# Patient Record
Sex: FEMALE | Race: WHITE | NOT HISPANIC OR LATINO | Employment: FULL TIME | URBAN - METROPOLITAN AREA
[De-identification: names, ages, dates, MRNs, and addresses within clinical notes are randomized per-mention and may not be internally consistent; named-entity substitution may affect disease eponyms.]

---

## 2014-07-07 LAB — PAP SMEAR - HIM PATIENT REPORTED: NEGATIVE

## 2017-03-21 ENCOUNTER — TRANSFERRED RECORDS (OUTPATIENT)
Dept: HEALTH INFORMATION MANAGEMENT | Facility: CLINIC | Age: 32
End: 2017-03-21

## 2017-03-21 LAB
C TRACH DNA SPEC QL PROBE+SIG AMP: NEGATIVE
HPV ABSTRACT: NORMAL
N GONORRHOEA DNA SPEC QL PROBE+SIG AMP: NEGATIVE
PAP-ABSTRACT: NORMAL
SPECIMEN DESCRIP: NORMAL
SPECIMEN DESCRIPTION: NORMAL

## 2017-10-22 ENCOUNTER — HEALTH MAINTENANCE LETTER (OUTPATIENT)
Age: 32
End: 2017-10-22

## 2018-05-30 ENCOUNTER — HOSPITAL ENCOUNTER (OUTPATIENT)
Dept: ULTRASOUND IMAGING | Facility: CLINIC | Age: 33
Discharge: HOME | End: 2018-05-30
Attending: PEDIATRICS | Admitting: PEDIATRICS
Payer: COMMERCIAL

## 2018-05-30 ENCOUNTER — OFFICE VISIT (OUTPATIENT)
Dept: PEDIATRICS | Facility: CLINIC | Age: 33
End: 2018-05-30
Payer: COMMERCIAL

## 2018-05-30 VITALS
WEIGHT: 137 LBS | OXYGEN SATURATION: 98 % | HEART RATE: 68 BPM | SYSTOLIC BLOOD PRESSURE: 102 MMHG | DIASTOLIC BLOOD PRESSURE: 74 MMHG | TEMPERATURE: 98.3 F | HEIGHT: 65 IN | BODY MASS INDEX: 22.82 KG/M2

## 2018-05-30 DIAGNOSIS — R60.0 EDEMA OF LEFT LOWER EXTREMITY: ICD-10-CM

## 2018-05-30 DIAGNOSIS — R60.0 EDEMA OF LEFT LOWER EXTREMITY: Primary | ICD-10-CM

## 2018-05-30 DIAGNOSIS — K51.011 ULCERATIVE PANCOLITIS WITH RECTAL BLEEDING (H): ICD-10-CM

## 2018-05-30 PROCEDURE — 93971 EXTREMITY STUDY: CPT | Mod: LT

## 2018-05-30 PROCEDURE — 99214 OFFICE O/P EST MOD 30 MIN: CPT | Performed by: PEDIATRICS

## 2018-05-30 RX ORDER — LEVOTHYROXINE SODIUM 50 UG/1
TABLET ORAL
Refills: 3 | COMMUNITY
Start: 2018-04-27 | End: 2023-06-12

## 2018-05-30 RX ORDER — PERPHENAZINE 2 MG
TABLET ORAL
COMMUNITY
Start: 2014-11-17 | End: 2023-06-12

## 2018-05-30 RX ORDER — ACETAMINOPHEN 160 MG
2000 TABLET,DISINTEGRATING ORAL
COMMUNITY
Start: 2014-10-20 | End: 2023-06-12

## 2018-05-30 RX ORDER — PRENATAL VIT/IRON FUM/FOLIC AC 27MG-0.8MG
1 TABLET ORAL
COMMUNITY
Start: 2014-10-20 | End: 2023-06-12

## 2018-05-30 RX ORDER — VITAMIN B COMPLEX
1 TABLET ORAL
COMMUNITY
Start: 2015-05-01 | End: 2023-06-12

## 2018-05-30 NOTE — MR AVS SNAPSHOT
After Visit Summary   5/30/2018    Margi Little    MRN: [id number]           Patient Information     Date Of Birth          1985        Visit Information        Provider Department      5/30/2018 10:20 AM Ivonne Lipscomb MD [facility]        Today's Diagnoses     Ulcerative pancolitis with rectal bleeding (H)    -  1    Edema of left lower extremity          Care Instructions    [place] [facility] [place] [facility] - Raciel Dwyer - expect a call to set up a visit to talk about UC    We'll set up an ultrasound for you before you leave - if positive for clot, we'll talk later today, if negative, we can make a long term plan    Come back at your convenience for a physical          Follow-ups after your visit        Additional Services     GASTROENTEROLOGY ADULT REF CONSULT ONLY       Preferred Location: [facility] [phone]      Please be aware that coverage of these services is subject to the terms and limitations of your health insurance plan.  Call member services at your health plan with any benefit or coverage questions.  Any procedures must be performed at a [facility] facility OR coordinated by your clinic's referral office.    Please bring the following with you to your appointment:    (1) Any X-Rays, CTs or MRIs which have been performed.  Contact the facility where they were done to arrange for  prior to your scheduled appointment.    (2) List of current medications   (3) This referral request   (4) Any documents/labs given to you for this referral                  Future tests that were ordered for you today     Open Future Orders        Priority Expected Expires Ordered    US Lower Extremity Venous Duplex Left STAT  5/30/2019 5/30/2018            Who to contact     If you have questions or need follow up information about today's clinic visit or your schedule please contact [facility] directly at [phone].  Normal or non-critical lab and imaging results  "will be communicated to you by MyChart, letter or phone within 4 business days after the clinic has received the results. If you do not hear from us within 7 days, please contact the clinic through Platform[employer] or phone. If you have a critical or abnormal lab result, we will notify you by phone as soon as possible.  Submit refill requests through Platform[employer] or call your pharmacy and they will forward the refill request to us. Please allow 3 business days for your refill to be completed.          Additional Information About Your Visit        PlatformQ Information     PlatformQ lets you send messages to your doctor, view your test results, renew your prescriptions, schedule appointments and more. To sign up, go to [email or web address] . Click on \"Log in\" on the left side of the screen, which will take you to the Welcome page. Then click on \"Sign up Now\" on the right side of the page.     You will be asked to enter the access code listed below, as well as some personal information. Please follow the directions to create your username and password.     Your access code is: [email or web address]  Expires: 2018 11:03 AM     Your access code will  in 90 days. If you need help or a new code, please call your [facility] clinic or [phone].        Care EveryWhere ID     This is your Care EveryWhere ID. This could be used by other organizations to access your [facility] medical records  [id number]        Your Vitals Were     Pulse Temperature Height Pulse Oximetry Breastfeeding? BMI (Body Mass Index)    68 98.3  F (36.8  C) (Tympanic) 5' 5\" (1.651 m) 98% Yes 22.8 kg/m2       Blood Pressure from Last 3 Encounters:   18 102/74   12 98/62   12 96/68    Weight from Last 3 Encounters:   18 137 lb (62.1 kg)   12 136 lb (61.7 kg)   04/10/12 140 lb (63.5 kg)              We Performed the Following     GASTROENTEROLOGY ADULT REF CONSULT ONLY        Primary Care Provider Office Phone # Fax #    Ivonne Traylor " MD [place] [phone] [address] [place] [address]        Equal Access to Services     [facility] AUGUSTO : Hadii aad ku haddonshaan Sabinedidi, walupeda netoqiha, eribertota kadavidmaile nicoledheeraj, arcenio celinain hayaayvette nicolecarina guzman lamaryyvette eda. So [facility] [phone].    ATENCIÓN: Si habla español, tiene a chin disposición servicios gratuitos de asistencia lingüística. Llame al [phone].    We comply with applicable federal civil rights laws and [place] laws. We do not discriminate on the basis of race, color, national origin, age, disability, sex, sexual orientation, or gender identity.            Thank you!     Thank you for choosing [facility]  for your care. Our goal is always to provide you with excellent care. Hearing back from our patients is one way we can continue to improve our services. Please take a few minutes to complete the written survey that you may receive in the mail after your visit with us. Thank you!             Your Updated Medication List - Protect others around you: Learn how to safely use, store and throw away your medicines at www.disposemymeds.org.          This list is accurate as of 5/30/18 11:03 AM.  Always use your most recent med list.                   Brand Name Dispense Instructions for use Diagnosis    levothyroxine 50 MCG tablet    SYNTHROID/LEVOTHROID     TK 1 T PO QD        MAGNESIUM PO           Omega 3-6-9 Caps           prenatal multivitamin plus iron 27-0.8 MG Tabs per tablet      Take 1 tablet by mouth        vitamin D3 2000 units Caps      Take 2,000 Units by mouth        Vitamin-B Complex Tabs      Take 1 tablet by mouth

## 2018-05-30 NOTE — PROGRESS NOTES
SUBJECTIVE:   Margi Little is a 33 year old female who presents to clinic today for the following health issues:      Possible DVT      Duration: noticed Wednesday     Description (location/character/radiation): dark area with mass behind left knee     Intensity:  moderate    Accompanying signs and symptoms: patient fly's a lot for work, last week after flight noticed mass behind left knee with warmth to the touch, painful     History (similar episodes/previous evaluation): None    Precipitating or alleviating factors: None    Therapies tried and outcome: None       3 yo and 8 month old children at home - not currently on birth control pills.    Travels regularly for work - came home from [place] middle of last week.    Usually travels Monday-Wednesday.    Started to have redness of posterior of left knee middle of last week upon her return from plane travel - tender and red initially - now improved.  No ankle/calf swelling.  No lower extremity pain.  No recollection of trauma to area, though was in and out of airports, carrying luggage, etc.  No fevers.  No changes in breathing during this time period.    No personal history of blood clots.  Two pregnancies in the last 4 years.    Grandmother had a history of blood clots - no other information about these known - patient plans to reach out to her family for more information.    At the end of most recent pregnancy, had a lot of rectal bleeding.    Persistent rectal bleeding was investigated with a colonoscopy in March - diagnosed with Ulcerative Colitis based on these results.  The official report not currently available for my review, but patient plans to send for my review.  She had medications prescribed, but hasn't started them.  Currently having improvement in rectal bleeding - predominantly struggling with mucous in stools now.  Has had improvement with acupuncture and diet changes.   Not currently following with a gastroenterologist, but is  "interested in pursuing this.        Problem list and histories reviewed & adjusted, as indicated.  Additional history: as documented    Reviewed and updated as needed this visit by clinical staff  Tobacco  Allergies  Meds  Med Hx  Surg Hx  Fam Hx  Soc Hx      Reviewed and updated as needed this visit by Provider         ROS:  Constitutional, cardiovascular, pulmonary, msk, neuro systems are negative, except as otherwise noted.    OBJECTIVE:     /74 (BP Location: Right arm, Patient Position: Right side, Cuff Size: Adult Regular)  Pulse 68  Temp 98.3  F (36.8  C) (Tympanic)  Ht 5' 5\" (1.651 m)  Wt 137 lb (62.1 kg)  SpO2 98%  Breastfeeding? Yes  BMI 22.8 kg/m2  Body mass index is 22.8 kg/(m^2).  GENERAL: healthy, alert and no distress  RESP: lungs clear to auscultation - no rales, rhonchi or wheezes  CV: regular rate and rhythm, normal S1 S2, no S3 or S4, no murmur, click or rub, no peripheral edema and peripheral pulses strong  MS: palpable nodule with associated swelling superior to popliteal fossa, left posterior leg with associated yellowing bruise - no overlying erythema, fluctuance, or purulence.   No calf or ankle tenderness.  Symmetric calf and ankle sizes bilaterally without edema distal to affected area.  Brisk capillary refill.    NEURO: Normal strength and tone, mentation intact and speech normal  PSYCH: mentation appears normal, affect normal/bright    Diagnostic Test Results:  LE ultrasound pending    ASSESSMENT/PLAN:       ICD-10-CM    1. Edema of left lower extremity R60.0 US Lower Extremity Venous Duplex Left     With recent travel, persistent symptoms over the last several days, recommend ultrasound to evaluate for DVT, Baker's cyst.  Further treatment plan based on results.  Reviewed recent lab work through her Ob office.   Imaging arranged for later today.   2. Ulcerative pancolitis with rectal bleeding (H) K51.011 GASTROENTEROLOGY ADULT REF CONSULT ONLY  Patient to send me " records from recent colonoscopy with concern for UC involving entire colon.  Highly recommended close follow up with gastroenterology - she is in agreement.       See Patient Instructions    Ivonne Lipscomb MD  [facility]

## 2018-05-30 NOTE — PATIENT INSTRUCTIONS
[place] office [place] LATISHA Dwyer - expect a call to set up a visit to talk about UC    We'll set up an ultrasound for you before you leave - if positive for clot, we'll talk later today, if negative, we can make a long term plan    Come back at your convenience for a physical

## 2018-06-27 ENCOUNTER — OFFICE VISIT - HEALTHEAST (OUTPATIENT)
Dept: FAMILY MEDICINE | Facility: CLINIC | Age: 33
End: 2018-06-27

## 2018-06-27 DIAGNOSIS — Z00.00 ROUTINE GENERAL MEDICAL EXAMINATION AT A HEALTH CARE FACILITY: ICD-10-CM

## 2018-06-27 DIAGNOSIS — K51.90 ULCERATIVE COLITIS (H): ICD-10-CM

## 2018-06-27 DIAGNOSIS — E03.9 HYPOTHYROIDISM, UNSPECIFIED TYPE: ICD-10-CM

## 2018-06-27 DIAGNOSIS — N97.0 INFERTILITY, ANOVULATION: ICD-10-CM

## 2018-06-27 DIAGNOSIS — R79.89 ELEVATED LFTS: ICD-10-CM

## 2018-06-27 LAB
ALBUMIN SERPL-MCNC: 4.2 G/DL (ref 3.5–5)
ALP SERPL-CCNC: 57 U/L (ref 45–120)
ALT SERPL W P-5'-P-CCNC: 43 U/L (ref 0–45)
AST SERPL W P-5'-P-CCNC: 42 U/L (ref 0–40)
BILIRUB DIRECT SERPL-MCNC: 0.2 MG/DL
BILIRUB SERPL-MCNC: 0.5 MG/DL (ref 0–1)
C REACTIVE PROTEIN LHE: 0.2 MG/DL (ref 0–0.8)
ERYTHROCYTE [DISTWIDTH] IN BLOOD BY AUTOMATED COUNT: 11.1 % (ref 11–14.5)
HCT VFR BLD AUTO: 41.1 % (ref 35–47)
HGB BLD-MCNC: 13.6 G/DL (ref 12–16)
MCH RBC QN AUTO: 28.7 PG (ref 27–34)
MCHC RBC AUTO-ENTMCNC: 33.1 G/DL (ref 32–36)
MCV RBC AUTO: 87 FL (ref 80–100)
PLATELET # BLD AUTO: 282 THOU/UL (ref 140–440)
PMV BLD AUTO: 7.3 FL (ref 7–10)
PROT SERPL-MCNC: 7.4 G/DL (ref 6–8)
RBC # BLD AUTO: 4.73 MILL/UL (ref 3.8–5.4)
T3FREE SERPL-MCNC: 2.3 PG/ML (ref 1.9–3.9)
T4 FREE SERPL-MCNC: 1 NG/DL (ref 0.7–1.8)
TSH SERPL DL<=0.005 MIU/L-ACNC: 2.51 UIU/ML (ref 0.3–5)
WBC: 16.9 THOU/UL (ref 4–11)

## 2018-06-27 ASSESSMENT — MIFFLIN-ST. JEOR: SCORE: 1319.57

## 2018-06-28 LAB — THYROID PEROXIDASE ANTIBODIES - HISTORICAL: 432.8 IU/ML (ref 0–5.6)

## 2018-06-29 LAB — THYROGLOB AB SERPL-ACNC: 1.8 IU/ML (ref 0–4)

## 2018-06-30 LAB — ZINC SERPL-MCNC: 68 UG/DL (ref 60–120)

## 2018-07-03 ENCOUNTER — AMBULATORY - HEALTHEAST (OUTPATIENT)
Dept: FAMILY MEDICINE | Facility: CLINIC | Age: 33
End: 2018-07-03

## 2018-07-03 DIAGNOSIS — D72.829 LEUCOCYTOSIS: ICD-10-CM

## 2018-07-17 ENCOUNTER — RECORDS - HEALTHEAST (OUTPATIENT)
Dept: ADMINISTRATIVE | Facility: OTHER | Age: 33
End: 2018-07-17

## 2018-08-07 ENCOUNTER — COMMUNICATION - HEALTHEAST (OUTPATIENT)
Dept: FAMILY MEDICINE | Facility: CLINIC | Age: 33
End: 2018-08-07

## 2018-08-14 ENCOUNTER — AMBULATORY - HEALTHEAST (OUTPATIENT)
Dept: LAB | Facility: CLINIC | Age: 33
End: 2018-08-14

## 2018-08-14 DIAGNOSIS — D72.829 LEUCOCYTOSIS: ICD-10-CM

## 2018-08-15 ENCOUNTER — COMMUNICATION - HEALTHEAST (OUTPATIENT)
Dept: FAMILY MEDICINE | Facility: CLINIC | Age: 33
End: 2018-08-15

## 2018-08-15 LAB
BASOPHILS # BLD AUTO: 0.1 THOU/UL (ref 0–0.2)
BASOPHILS NFR BLD AUTO: 1 % (ref 0–2)
EOSINOPHIL # BLD AUTO: 0.2 THOU/UL (ref 0–0.4)
EOSINOPHIL NFR BLD AUTO: 2 % (ref 0–6)
ERYTHROCYTE [DISTWIDTH] IN BLOOD BY AUTOMATED COUNT: 13 % (ref 11–14.5)
HCT VFR BLD AUTO: 40.7 % (ref 35–47)
HGB BLD-MCNC: 13.7 G/DL (ref 12–16)
LYMPHOCYTES # BLD AUTO: 2.9 THOU/UL (ref 0.8–4.4)
LYMPHOCYTES NFR BLD AUTO: 23 % (ref 20–40)
MCH RBC QN AUTO: 29.7 PG (ref 27–34)
MCHC RBC AUTO-ENTMCNC: 33.7 G/DL (ref 32–36)
MCV RBC AUTO: 88 FL (ref 80–100)
MONOCYTES # BLD AUTO: 1.1 THOU/UL (ref 0–0.9)
MONOCYTES NFR BLD AUTO: 9 % (ref 2–10)
NEUTROPHILS # BLD AUTO: 7.9 THOU/UL (ref 2–7.7)
NEUTROPHILS NFR BLD AUTO: 66 % (ref 50–70)
PATH REPORT.MICROSCOPIC SPEC OTHER STN: ABNORMAL
PLATELET # BLD AUTO: 276 THOU/UL (ref 140–440)
PMV BLD AUTO: 11 FL (ref 8.5–12.5)
RBC # BLD AUTO: 4.62 MILL/UL (ref 3.8–5.4)
WBC: 12.2 THOU/UL (ref 4–11)

## 2018-08-18 LAB
LAB AP CHARGES (HE HISTORICAL CONVERSION): NORMAL
PATH REPORT.COMMENTS IMP SPEC: NORMAL
PATH REPORT.COMMENTS IMP SPEC: NORMAL
PATH REPORT.FINAL DX SPEC: NORMAL
PATH REPORT.RELEVANT HX SPEC: NORMAL

## 2018-08-19 ENCOUNTER — COMMUNICATION - HEALTHEAST (OUTPATIENT)
Dept: FAMILY MEDICINE | Facility: CLINIC | Age: 33
End: 2018-08-19

## 2018-08-24 ENCOUNTER — OFFICE VISIT - HEALTHEAST (OUTPATIENT)
Dept: FAMILY MEDICINE | Facility: CLINIC | Age: 33
End: 2018-08-24

## 2018-08-24 DIAGNOSIS — K92.9 DIGESTIVE DISORDER: ICD-10-CM

## 2018-08-24 DIAGNOSIS — K51.90 ULCERATIVE COLITIS (H): ICD-10-CM

## 2018-08-24 ASSESSMENT — MIFFLIN-ST. JEOR: SCORE: 1339.98

## 2018-08-27 ENCOUNTER — COMMUNICATION - HEALTHEAST (OUTPATIENT)
Dept: FAMILY MEDICINE | Facility: CLINIC | Age: 33
End: 2018-08-27

## 2019-02-11 ENCOUNTER — COMMUNICATION - HEALTHEAST (OUTPATIENT)
Dept: FAMILY MEDICINE | Facility: CLINIC | Age: 34
End: 2019-02-11

## 2019-02-18 ENCOUNTER — OFFICE VISIT - HEALTHEAST (OUTPATIENT)
Dept: FAMILY MEDICINE | Facility: CLINIC | Age: 34
End: 2019-02-18

## 2019-02-18 DIAGNOSIS — Z34.90 EARLY STAGE OF PREGNANCY: ICD-10-CM

## 2019-02-18 DIAGNOSIS — K51.90 ULCERATIVE COLITIS WITHOUT COMPLICATIONS, UNSPECIFIED LOCATION (H): ICD-10-CM

## 2019-02-18 ASSESSMENT — MIFFLIN-ST. JEOR: SCORE: 1366.06

## 2019-05-14 ENCOUNTER — COMMUNICATION - HEALTHEAST (OUTPATIENT)
Dept: FAMILY MEDICINE | Facility: CLINIC | Age: 34
End: 2019-05-14

## 2019-08-30 ENCOUNTER — RECORDS - HEALTHEAST (OUTPATIENT)
Dept: ADMINISTRATIVE | Facility: OTHER | Age: 34
End: 2019-08-30

## 2019-08-31 ENCOUNTER — RECORDS - HEALTHEAST (OUTPATIENT)
Dept: ADMINISTRATIVE | Facility: OTHER | Age: 34
End: 2019-08-31

## 2019-10-10 ENCOUNTER — AMBULATORY - HEALTHEAST (OUTPATIENT)
Dept: MULTI SPECIALTY CLINIC | Facility: CLINIC | Age: 34
End: 2019-10-10

## 2019-10-10 LAB
HPV_EXT - HISTORICAL: NORMAL
PAP SMEAR - HIM PATIENT REPORTED: NORMAL

## 2020-01-27 ENCOUNTER — COMMUNICATION - HEALTHEAST (OUTPATIENT)
Dept: FAMILY MEDICINE | Facility: CLINIC | Age: 35
End: 2020-01-27

## 2020-01-27 DIAGNOSIS — Z20.828 EXPOSURE TO INFLUENZA: ICD-10-CM

## 2020-03-01 ENCOUNTER — HEALTH MAINTENANCE LETTER (OUTPATIENT)
Age: 35
End: 2020-03-01

## 2020-09-03 ENCOUNTER — RECORDS - HEALTHEAST (OUTPATIENT)
Dept: ADMINISTRATIVE | Facility: OTHER | Age: 35
End: 2020-09-03

## 2020-10-26 ENCOUNTER — RECORDS - HEALTHEAST (OUTPATIENT)
Dept: ADMINISTRATIVE | Facility: OTHER | Age: 35
End: 2020-10-26

## 2020-10-27 ENCOUNTER — RECORDS - HEALTHEAST (OUTPATIENT)
Dept: ADMINISTRATIVE | Facility: OTHER | Age: 35
End: 2020-10-27

## 2020-10-28 ENCOUNTER — RECORDS - HEALTHEAST (OUTPATIENT)
Dept: ADMINISTRATIVE | Facility: OTHER | Age: 35
End: 2020-10-28

## 2020-12-14 ENCOUNTER — HEALTH MAINTENANCE LETTER (OUTPATIENT)
Age: 35
End: 2020-12-14

## 2021-03-09 ENCOUNTER — RECORDS - HEALTHEAST (OUTPATIENT)
Dept: ADMINISTRATIVE | Facility: OTHER | Age: 36
End: 2021-03-09

## 2021-04-14 ENCOUNTER — MYC MEDICAL ADVICE (OUTPATIENT)
Dept: PEDIATRICS | Facility: CLINIC | Age: 36
End: 2021-04-14

## 2021-04-17 ENCOUNTER — HEALTH MAINTENANCE LETTER (OUTPATIENT)
Age: 36
End: 2021-04-17

## 2021-06-01 VITALS — HEIGHT: 66 IN | BODY MASS INDEX: 21.53 KG/M2 | WEIGHT: 134 LBS

## 2021-06-01 VITALS — HEIGHT: 66 IN | BODY MASS INDEX: 22.26 KG/M2 | WEIGHT: 138.5 LBS

## 2021-06-02 VITALS — WEIGHT: 144.25 LBS | HEIGHT: 66 IN | BODY MASS INDEX: 23.18 KG/M2

## 2021-06-05 NOTE — TELEPHONE ENCOUNTER
"Who is calling:  Patient  Reason for Call:  Patient stated Dr. Alfaro told her he was going to send her Tamiflu but nothing is at [facility] in [place]. See the Junko Tada message from 1/27/20. Dr. Alfaro sent a \"no print\" Rx. See the medication details below that was copied.    Please send in an Rx to [facility], for Tamiflu.  Date of last appointment with primary care: 2/18/19  Okay to leave a detailed message: Yes [phone]        _____________________________    oseltamivir (TAMIFLU) 75 MG capsule 7 capsule 0 1/27/2020 2/3/2020 --   Sig - Route: Take 1 capsule (75 mg total) by mouth daily for 7 days. - Oral   Class: No Print   oseltamivir (TAMIFLU) 75 MG capsule [[id number]]     Electronically signed by: Diana Alfaro MD on 01/27/20 1731 Status: Active   Ordering user: Diana Alfaro MD 01/27/20 1731 Authorized by: Diana Alfaro MD   Frequency: DAILY 01/27/20 - 7  days   Diagnoses  Exposure to influenza [Z20.828]         "

## 2021-06-18 NOTE — PROGRESS NOTES
FEMALE ADULT PREVENTIVE EXAM    CHIEF COMPLAINT:  Female preventive exam.    SUBJECTIVE:  Margi Little is a 33 y.o. female who presents for her routine physical exam.    Patient would like to address the following concerns today:   Chief Complaint   Patient presents with     Annual Exam     Last pap 2017, normal      1) Infertility - Used acupuncture in the past to get pregnant but has also seen reproductive medicine.  She has never had her period on her own.  Currently breastfeeding and no cycles..  2) Ulcerative colitis - Diagnosed a few years ago.  Had colonoscopy in 2/18 with mildly active chronic colitis.  Has had elevated white count.  She would like to avoid taking steroids or other meds.  Stool mucousy.  Has some blood once a week. No bloating or abdominal pain.  Has bowel movement once a day- soft.  She is interested in a functional medicine approach to her IBD.  3) Hypothyroidism - Started on thyroid replacement during infertility treatment.  Wonders if she needs to be on the med.  Not sure if she has elevated thyroid antibodies.      GYNE HISTORY  Menses: no  Sexually Active: yes  Contraception: no  Last Pap: 2017 normal  Abnormal Pap: 2007      She  has no past medical history on file.    No results found for: WBC, HGB, HCT, MCV, PLT, NA, K, BUN  No results found for: CHOL, HDL, LDLCALC, TRIG  No results found for: TSH  BP Readings from Last 3 Encounters:   06/27/18 110/68       Surgeries:  No past surgical history on file.    Family History:  Her mother had breast cancer.     Social History:  She  reports that she has never smoked. She has never used smokeless tobacco. She reports that she does not drink alcohol or use illicit drugs. Lives with .  Has 9 mo and 3 yo children.  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    Medications:    Current Outpatient Prescriptions:      b complex vitamins tablet, Take 1 tablet by mouth., Disp: , Rfl:      calcium-magnesium 300-300  mg Tab, Take by mouth., Disp: , Rfl:      cholecalciferol, vitamin D3, 2,000 unit capsule, Take 2,000 Units by mouth., Disp: , Rfl:      fish,bora,flax oils-om3,6,9no1 (OMEGA 3-6-9) 1,200 mg cap, Take by mouth., Disp: , Rfl:      levothyroxine (SYNTHROID, LEVOTHROID) 50 MCG tablet, TK 1 T PO QD, Disp: , Rfl:      magnesium sulfate 100 mg cap, Take by mouth., Disp: , Rfl:      prenatal vitamin iron-folic acid 27mg-0.8mg (PRENATAL S) 27 mg iron- 800 mcg Tab tablet, Take 1 tablet by mouth., Disp: , Rfl:   HELD MEDICATIONS: None.      Allergies:  No latex allergies.  Allergies   Allergen Reactions     Amoxicillin Rash            RISK BEHAVIOR & HEALTH HABITS  Regular Exercise: yoga.  Balanced diet: yes- limited dairy.  Calcium/vitamin D: vitamin D3 1000 IU  Stress management: yoga/ walking  Seat Belt Use: yes  Dental Care: YES    REVIEW OF SYSTEMS:  Complete head to toe review of systems is otherwise negative except as above.    OBJECTIVE:  VITAL SIGNS:    Vitals:    06/27/18 1311   BP: 110/68   Pulse: 62   Resp: 16   SpO2: 98%     GENERAL:  Patient alert, in no acute distress.  EYES: PERRLA. Extraoccular movements intact, pupils equal, reactive to light and accommodation.  Normal conjunctiva and lids.   ENT:  Hearing grossly normal.  Normal appearance to ears and nose.  Bilateral TM s, external canals, oropharynx normal. Normal lips, gums and teeth.   NECK:  Supple, without thyromegaly or mass.  RESP:  Clear to auscultation without crackles, wheezes or distress.  Normal respiratory effort.   CV:  Regular rate and rhythm without murmurs, rubs or gallops.  Normal pedal pulses.  No varicosities or edema.  ABDOMEN:  Soft, non-tender, without hepatosplenomegaly, masses, or hernias.   BREASTS:  Nontender, without masses, nipple discharge, erythema, or axillary adenopathy.    LYMPHATIC: No cervical or axillary lymphadenopathy.  No bruising.  NEURO:  CN II-XII intact, motor & sensory function all intact.  DTR and reflexes  normal.  PSYCHIATRIC:  Alert & oriented with normal mood and affect.  Good judgment and insight.  SKIN:  Normal inspection and palpation.  MUSCULOSKELETAL: Normal gait and station.  - Spine / Ribs / Pelvis: Normal inspection, ROM, stability and strength: Spine, Head, Neck, Upper and Lower Extremities.      Margi was seen today for annual exam.    Diagnoses and all orders for this visit:    Routine general medical examination at a health care facility  Up to date with pap.    Hypothyroidism, unspecified type  -     T3 (Triiodothyronine), Free  -     T4, Free  -     Thyroglobulin Antibody  -     Thyroid Peroxidase Antibody  -     Thyroid Stimulating Hormone (TSH)    Ulcerative colitis (H)- She wants to pursue functional medicine approach.  I advised that she may need to consider using both medications and diet/ supplements to control her disease.  We will start with a [facility] Stool Effects profile and have her f/u with me.  -     HM2(CBC w/o Differential)  -     Zinc, Serum or Plasma  -     C-Reactive Protein    Elevated LFTs  -     Hepatic Profile    Infertility, anovulation    Patient Instructions   Here are two references which provide a functional medicine approach to autoimmune disease:    The Autoimmune Solution by Dr. Renetta Swartz.    The Immune System Recovery Plan by Dr. Tresa Mcknight    [facility] Stool Effects - I will send kit to your home        Angella Mathis

## 2021-06-20 NOTE — PROGRESS NOTES
"SUBJECTIVE: Margi Little is a 33 y.o. female with;  Chief Complaint   Patient presents with     Results     f/u    She is here to f/u on [facility] test.  She has been doing fairly well with her digestion.  She has not had any blood in her stools.  Has soft stools- no real diarrhea.  Has not been on any meds for her ulcerative colitis.  Still breast feeding but plans to stop soon.    OBJECTIVE: /68 (Patient Site: Left Arm, Patient Position: Sitting, Cuff Size: Adult Regular)  Pulse 64  Ht 5' 6\" (1.676 m)  Wt 138 lb 8 oz (62.8 kg)  BMI 22.35 kg/m2 no distress    Lab Results   Component Value Date    WBC 12.2 (H) 08/14/2018    HGB 13.7 08/14/2018    HCT 40.7 08/14/2018    MCV 88 08/14/2018     08/14/2018     [facility] Stool Effects: Increased EPX / increased fecal fats / low SCFA and n-butyrate / candida albicans 2+ / proteus potential pathogen 4+    Margi was seen today for results.    Diagnoses and all orders for this visit:    Ulcerative colitis (H)    Digestive disorder       Patient Instructions   Here are recommendations for supplements:    To order supplements go to the web site: [employer]  Use my authorization number to order the recommended supplements: S99    ABx Support 1 capsule twice a day ( probiotics)    Galactomune Prebiotics 1 capsule twice a day ( prebiotics which will help increase the short chain fatty acids and n-butyrate as well as provide food for good bacteria)    V-NZYME blend 1 capsule with meals ( digestive enzymes with lipase to help digest fats    I feel that you can take these 3 supplements while breastfeeding.  After you stop breast feeding I would consider the following supplement to take for a month to help treat the yeast and bad bacteria in the gut:    Candida Complex - Start with 1 twice a day and work up to 2 twice a day for 1 month.    Update me with a My Chart message in a couple of months.    20 minutes spent with the patient.  Over 50% were spent in " counseling and coordination of care.     Angella Mathis

## 2021-06-24 NOTE — PROGRESS NOTES
"SUBJECTIVE: Margi Little is a 34 y.o. female with:  Chief Complaint   Patient presents with     Follow-up     Colitis    She has a history of ulcerative colitis.  She did a GI treatment program in August of 2018 and was doing very well with her digestion until recently.  She became pregnant whle breastfeeding.  She is currently under care of OB and is 9 weeks pregnant.  She is a little more lax in her diet.  Still avoiding dairy but has been eating some gluten.  She went off all her supplements.  She is having a lot of bloating.  Stools are diarrhea/ mucousy. Also having lot of gas. Has slight blood in mucous.  Diet- bland food.  Has nausea / vomiting of pregnancy - Taking B6/ acupuncture/ bands.  Will try to eat some fat at night but in am still gets nausea.  Eating white rice /noodles.  She is gaining weight.  Wonders what supplements she can take with pregnancy.      OBJECTIVE: BP 98/62 (Patient Site: Right Arm, Patient Position: Sitting, Cuff Size: Adult Regular)   Pulse 80   Ht 5' 6\" (1.676 m)   Wt 144 lb 4 oz (65.4 kg)   BMI 23.28 kg/m   no distress    Margi was seen today for follow-up.    Diagnoses and all orders for this visit:    Ulcerative colitis without complications, unspecified location (H)    Early stage of pregnancy     She can start back on probiotic- I recommend she continue prenatal/ fish oil. Would recommend she go gluten-free as this could be the cause of flare-up of symptoms.  If she is not improving in a week or two, I recommend she see GI and consider medical treatment.      Patient Instructions   To order supplements go to the web site: [employer]  Use my authorization number to order the recommended supplements: S99    Ther-Biotic Complete probiotic Take 1 daily or use the powder       Angella Mathis   "

## 2021-06-24 NOTE — PATIENT INSTRUCTIONS - HE
To order supplements go to the web site: [employer]  Use my authorization number to order the recommended supplements: S99    Ther-Biotic Complete probiotic Take 1 daily or use the powder

## 2021-06-30 ENCOUNTER — HOSPITAL ENCOUNTER (OUTPATIENT)
Dept: ULTRASOUND IMAGING | Facility: CLINIC | Age: 36
Discharge: HOME | End: 2021-06-30
Attending: FAMILY MEDICINE
Payer: COMMERCIAL

## 2021-06-30 DIAGNOSIS — R10.11 RUQ ABDOMINAL PAIN: ICD-10-CM

## 2021-10-02 ENCOUNTER — HEALTH MAINTENANCE LETTER (OUTPATIENT)
Age: 36
End: 2021-10-02

## 2022-02-22 ENCOUNTER — TRANSFERRED RECORDS (OUTPATIENT)
Dept: HEALTH INFORMATION MANAGEMENT | Facility: CLINIC | Age: 37
End: 2022-02-22
Payer: COMMERCIAL

## 2022-03-03 ENCOUNTER — MYC MEDICAL ADVICE (OUTPATIENT)
Dept: FAMILY MEDICINE | Facility: CLINIC | Age: 37
End: 2022-03-03
Payer: COMMERCIAL

## 2022-03-03 DIAGNOSIS — K51.919 ULCERATIVE COLITIS WITH COMPLICATION, UNSPECIFIED LOCATION (H): Primary | ICD-10-CM

## 2022-03-04 ENCOUNTER — TRANSFERRED RECORDS (OUTPATIENT)
Dept: HEALTH INFORMATION MANAGEMENT | Facility: CLINIC | Age: 37
End: 2022-03-04
Payer: COMMERCIAL

## 2022-03-04 LAB
ALT SERPL-CCNC: 26 IU/L (ref 0–32)
ALT SERPL-CCNC: 26 IU/L (ref 0–32)
AST SERPL-CCNC: 30 IU/L (ref 0–40)
AST SERPL-CCNC: 30 IU/L (ref 0–40)
CREATININE (EXTERNAL): 1.08 MG/DL (ref 0.57–1)
CREATININE (EXTERNAL): 1.08 MG/DL (ref 0.57–1)
GFR ESTIMATED (EXTERNAL): 68 ML/MIN/1.73
GFR ESTIMATED (EXTERNAL): 68 ML/MIN/1.73
GLUCOSE (EXTERNAL): 77 MG/DL (ref 65–99)
GLUCOSE (EXTERNAL): 77 MG/DL (ref 65–99)
POTASSIUM (EXTERNAL): 4.4 MMOL/L (ref 3.5–5.2)
POTASSIUM (EXTERNAL): 4.4 MMOL/L (ref 3.5–5.2)

## 2022-03-07 ENCOUNTER — TRANSFERRED RECORDS (OUTPATIENT)
Dept: HEALTH INFORMATION MANAGEMENT | Facility: CLINIC | Age: 37
End: 2022-03-07
Payer: COMMERCIAL

## 2022-08-16 ENCOUNTER — TRANSFERRED RECORDS (OUTPATIENT)
Dept: HEALTH INFORMATION MANAGEMENT | Facility: CLINIC | Age: 37
End: 2022-08-16

## 2022-08-28 ENCOUNTER — HEALTH MAINTENANCE LETTER (OUTPATIENT)
Age: 37
End: 2022-08-28

## 2022-09-27 ENCOUNTER — TRANSFERRED RECORDS (OUTPATIENT)
Dept: HEALTH INFORMATION MANAGEMENT | Facility: CLINIC | Age: 37
End: 2022-09-27

## 2023-01-14 ENCOUNTER — HEALTH MAINTENANCE LETTER (OUTPATIENT)
Age: 38
End: 2023-01-14

## 2023-03-06 ENCOUNTER — MYC MEDICAL ADVICE (OUTPATIENT)
Dept: FAMILY MEDICINE | Facility: CLINIC | Age: 38
End: 2023-03-06
Payer: COMMERCIAL

## 2023-03-10 ENCOUNTER — TRANSFERRED RECORDS (OUTPATIENT)
Dept: HEALTH INFORMATION MANAGEMENT | Facility: CLINIC | Age: 38
End: 2023-03-10
Payer: COMMERCIAL

## 2023-03-21 ENCOUNTER — OFFICE VISIT (OUTPATIENT)
Dept: FAMILY MEDICINE | Facility: CLINIC | Age: 38
End: 2023-03-21
Payer: COMMERCIAL

## 2023-03-21 VITALS
RESPIRATION RATE: 16 BRPM | OXYGEN SATURATION: 98 % | BODY MASS INDEX: 22.84 KG/M2 | HEIGHT: 66 IN | WEIGHT: 142.1 LBS | HEART RATE: 55 BPM | SYSTOLIC BLOOD PRESSURE: 106 MMHG | DIASTOLIC BLOOD PRESSURE: 72 MMHG

## 2023-03-21 DIAGNOSIS — E03.9 HYPOTHYROIDISM, UNSPECIFIED TYPE: Primary | ICD-10-CM

## 2023-03-21 DIAGNOSIS — E55.9 VITAMIN D DEFICIENCY: ICD-10-CM

## 2023-03-21 DIAGNOSIS — E63.9 NUTRITIONAL DEFICIENCY: ICD-10-CM

## 2023-03-21 PROCEDURE — 84255 ASSAY OF SELENIUM: CPT | Mod: 90 | Performed by: FAMILY MEDICINE

## 2023-03-21 PROCEDURE — 99213 OFFICE O/P EST LOW 20 MIN: CPT | Performed by: FAMILY MEDICINE

## 2023-03-21 PROCEDURE — 86376 MICROSOMAL ANTIBODY EACH: CPT | Performed by: FAMILY MEDICINE

## 2023-03-21 PROCEDURE — 83735 ASSAY OF MAGNESIUM: CPT | Mod: 90 | Performed by: FAMILY MEDICINE

## 2023-03-21 PROCEDURE — 36415 COLL VENOUS BLD VENIPUNCTURE: CPT | Performed by: FAMILY MEDICINE

## 2023-03-21 PROCEDURE — 82728 ASSAY OF FERRITIN: CPT | Performed by: FAMILY MEDICINE

## 2023-03-21 PROCEDURE — 84443 ASSAY THYROID STIM HORMONE: CPT | Performed by: FAMILY MEDICINE

## 2023-03-21 PROCEDURE — 84481 FREE ASSAY (FT-3): CPT | Performed by: FAMILY MEDICINE

## 2023-03-21 PROCEDURE — 84439 ASSAY OF FREE THYROXINE: CPT | Performed by: FAMILY MEDICINE

## 2023-03-21 PROCEDURE — 82306 VITAMIN D 25 HYDROXY: CPT | Performed by: FAMILY MEDICINE

## 2023-03-21 PROCEDURE — 86800 THYROGLOBULIN ANTIBODY: CPT | Performed by: FAMILY MEDICINE

## 2023-03-21 PROCEDURE — 99000 SPECIMEN HANDLING OFFICE-LAB: CPT | Performed by: FAMILY MEDICINE

## 2023-03-21 RX ORDER — MESALAMINE 1000 MG/1
SUPPOSITORY RECTAL
COMMUNITY
Start: 2023-01-20

## 2023-03-21 RX ORDER — LEVOTHYROXINE SODIUM 50 UG/1
50 TABLET ORAL DAILY
Qty: 90 TABLET | Refills: 0 | Status: SHIPPED | OUTPATIENT
Start: 2023-03-21 | End: 2023-11-16

## 2023-03-21 RX ORDER — MESALAMINE 1.2 G/1
TABLET, DELAYED RELEASE ORAL
COMMUNITY
Start: 2023-02-26

## 2023-03-21 NOTE — PROGRESS NOTES
Problem List Items Addressed This Visit    None  Visit Diagnoses     Hypothyroidism, unspecified type    -  Primary    Relevant Medications    levothyroxine (SYNTHROID/LEVOTHROID) 50 MCG tablet    Other Relevant Orders    REVIEW OF HEALTH MAINTENANCE PROTOCOL ORDERS (Completed)    TSH    T4 free    T3 Free    Anti thyroglobulin antibody    Thyroid peroxidase antibody    TSH with free T4 reflex    Vitamin D deficiency        Relevant Orders    Ferritin    Magnesium RBC    Selenium    Nutritional deficiency        Relevant Orders    Vitamin D Deficiency      Start on thyroid replacement and recheck TSH in 6-8 weeks. Will check for nutrients / minerals that support thyroid function. Evaluate for autoimmune thyroid disease.       Angelic Kiser is a 38 year old who presents for the following health issues   Chief Complaint   Patient presents with     RECHECK     Follow up on Hypothyroidism     She has a past history of sub-clinical hypothyroidism and was taking low dose levothyroxine for infertility issues.  She did feel better on the medicine. She did wean off thyroid replacement 3 years ago and last TSH was around 3 with normal FT3/ FT4.  She went to OB/ GYN and TSH was elevated to 5.17.  She does have some irregular periods/ night sweats. She has strong FH of thyroid disease including Hashimoto's. She was also diagnosed with inflammatory bowel disease.       History of Present Illness       Hypothyroidism:     Since last visit, patient describes the following symptoms::  Anxiety, Fatigue and Weight gain    Weight gain::  6-10 lbs.    She eats 4 or more servings of fruits and vegetables daily.She consumes 0 sweetened beverage(s) daily.She exercises with enough effort to increase her heart rate 30 to 60 minutes per day.  She exercises with enough effort to increase her heart rate 7 days per week.   She is taking medications regularly.       Review of Systems         Objective    /72 (BP Location:  "Left arm, Patient Position: Sitting, Cuff Size: Adult Regular)   Pulse 55   Resp 16   Ht 1.676 m (5' 6\")   Wt 64.5 kg (142 lb 1.6 oz)   LMP 02/25/2023   SpO2 98%   BMI 22.94 kg/m    Body mass index is 22.94 kg/m .  Physical Exam   GENERAL: Healthy, alert and no distress  EYES: Eyes grossly normal to inspection.  No discharge or erythema, or obvious scleral/conjunctival abnormalities.  RESP: No audible wheeze, cough, or visible cyanosis.  No visible retractions or increased work of breathing.    SKIN: Visible skin clear. No significant rash, abnormal pigmentation or lesions.  NEURO: Cranial nerves grossly intact.  Mentation and speech appropriate for age.  PSYCH: Mentation appears normal, affect normal/bright, judgement and insight intact, normal speech and appearance well-groomed.    Angella Mathis MD       "

## 2023-03-22 LAB
DEPRECATED CALCIDIOL+CALCIFEROL SERPL-MC: 32 UG/L (ref 20–75)
FERRITIN SERPL-MCNC: 36 NG/ML (ref 6–175)
T3FREE SERPL-MCNC: 2.8 PG/ML (ref 2–4.4)
T4 FREE SERPL-MCNC: 1.06 NG/DL (ref 0.9–1.7)
THYROGLOB AB SERPL IA-ACNC: <20 IU/ML
THYROPEROXIDASE AB SERPL-ACNC: 231 IU/ML
TSH SERPL DL<=0.005 MIU/L-ACNC: 7.7 UIU/ML (ref 0.3–4.2)

## 2023-03-23 LAB — SELENIUM SERPL-MCNC: 128 UG/L

## 2023-03-24 LAB — MAGNESIUM RBC-SCNC: 5.9 MG/DL

## 2023-05-25 ENCOUNTER — TELEPHONE (OUTPATIENT)
Dept: FAMILY MEDICINE | Facility: CLINIC | Age: 38
End: 2023-05-25
Payer: COMMERCIAL

## 2023-05-25 DIAGNOSIS — E03.9 HYPOTHYROIDISM, UNSPECIFIED TYPE: Primary | ICD-10-CM

## 2023-05-25 NOTE — TELEPHONE ENCOUNTER
"OV notes from 03/21/23 state    \"Start on thyroid replacement and recheck TSH in 6-8 weeks. Will check for nutrients / minerals that support thyroid function. Evaluate for autoimmune thyroid disease.\"    No lab orders in chart, please advise.  "

## 2023-05-25 NOTE — TELEPHONE ENCOUNTER
Reason for Call:  Other order    Detailed comments: patient called and needs thyroid testing from Delfina Hernandez at [facility].    No orders.    Please contact patient if needed.  Thank you.    Phone Number Patient can be reached at: Home number on file [phone] (home)    Best Time: any    Can we leave a detailed message on this number? YES    Call taken on 5/25/2023 at 10:00 AM by Nicol Jacques

## 2023-05-30 NOTE — TELEPHONE ENCOUNTER
Patient notified that labs ordered via detailed vm. Encouraged to call clinic with questions/concerns.

## 2023-06-09 ENCOUNTER — LAB (OUTPATIENT)
Dept: LAB | Facility: CLINIC | Age: 38
End: 2023-06-09
Payer: COMMERCIAL

## 2023-06-09 DIAGNOSIS — Z11.4 SCREENING FOR HIV (HUMAN IMMUNODEFICIENCY VIRUS): ICD-10-CM

## 2023-06-09 DIAGNOSIS — Z11.4 SCREENING FOR HUMAN IMMUNODEFICIENCY VIRUS: Primary | ICD-10-CM

## 2023-06-09 DIAGNOSIS — E03.9 HYPOTHYROIDISM, UNSPECIFIED TYPE: ICD-10-CM

## 2023-06-09 LAB
HIV 1+2 AB+HIV1 P24 AG SERPL QL IA: NONREACTIVE
T3FREE SERPL-MCNC: 2.5 PG/ML (ref 2–4.4)
T4 FREE SERPL-MCNC: 1.34 NG/DL (ref 0.9–1.7)
TSH SERPL DL<=0.005 MIU/L-ACNC: 4.46 UIU/ML (ref 0.3–4.2)

## 2023-06-09 PROCEDURE — 36415 COLL VENOUS BLD VENIPUNCTURE: CPT

## 2023-06-09 PROCEDURE — 84443 ASSAY THYROID STIM HORMONE: CPT

## 2023-06-09 PROCEDURE — 84439 ASSAY OF FREE THYROXINE: CPT

## 2023-06-09 PROCEDURE — 84481 FREE ASSAY (FT-3): CPT

## 2023-06-09 PROCEDURE — 87389 HIV-1 AG W/HIV-1&-2 AB AG IA: CPT

## 2023-06-11 ENCOUNTER — MYC MEDICAL ADVICE (OUTPATIENT)
Dept: FAMILY MEDICINE | Facility: CLINIC | Age: 38
End: 2023-06-11
Payer: COMMERCIAL

## 2023-06-11 DIAGNOSIS — E03.9 HYPOTHYROIDISM, UNSPECIFIED TYPE: Primary | ICD-10-CM

## 2023-06-12 RX ORDER — LEVOTHYROXINE SODIUM 75 UG/1
75 TABLET ORAL DAILY
Qty: 90 TABLET | Refills: 0 | Status: SHIPPED | OUTPATIENT
Start: 2023-06-12 | End: 2023-09-06

## 2023-09-06 ENCOUNTER — MYC MEDICAL ADVICE (OUTPATIENT)
Dept: FAMILY MEDICINE | Facility: CLINIC | Age: 38
End: 2023-09-06
Payer: COMMERCIAL

## 2023-09-06 DIAGNOSIS — E03.9 HYPOTHYROIDISM, UNSPECIFIED TYPE: ICD-10-CM

## 2023-09-06 RX ORDER — LEVOTHYROXINE SODIUM 75 UG/1
75 TABLET ORAL DAILY
Qty: 90 TABLET | Refills: 0 | Status: SHIPPED | OUTPATIENT
Start: 2023-09-06 | End: 2023-11-16

## 2023-09-30 ENCOUNTER — HEALTH MAINTENANCE LETTER (OUTPATIENT)
Age: 38
End: 2023-09-30

## 2023-11-16 ENCOUNTER — OFFICE VISIT (OUTPATIENT)
Dept: PEDIATRICS | Facility: CLINIC | Age: 38
End: 2023-11-16
Payer: COMMERCIAL

## 2023-11-16 VITALS
HEART RATE: 68 BPM | DIASTOLIC BLOOD PRESSURE: 64 MMHG | TEMPERATURE: 98.4 F | HEIGHT: 66 IN | BODY MASS INDEX: 22.61 KG/M2 | SYSTOLIC BLOOD PRESSURE: 108 MMHG | OXYGEN SATURATION: 98 % | WEIGHT: 140.7 LBS | RESPIRATION RATE: 16 BRPM

## 2023-11-16 DIAGNOSIS — E03.9 HYPOTHYROIDISM, UNSPECIFIED TYPE: Primary | ICD-10-CM

## 2023-11-16 DIAGNOSIS — K50.90 CROHN'S DISEASE WITHOUT COMPLICATION, UNSPECIFIED GASTROINTESTINAL TRACT LOCATION (H): ICD-10-CM

## 2023-11-16 LAB
T4 FREE SERPL-MCNC: 1.33 NG/DL (ref 0.9–1.7)
TSH SERPL DL<=0.005 MIU/L-ACNC: 2.17 UIU/ML (ref 0.3–4.2)

## 2023-11-16 PROCEDURE — 99213 OFFICE O/P EST LOW 20 MIN: CPT | Mod: GC

## 2023-11-16 PROCEDURE — 84439 ASSAY OF FREE THYROXINE: CPT

## 2023-11-16 PROCEDURE — 84443 ASSAY THYROID STIM HORMONE: CPT

## 2023-11-16 PROCEDURE — 36415 COLL VENOUS BLD VENIPUNCTURE: CPT

## 2023-11-16 RX ORDER — LEVOTHYROXINE SODIUM 75 UG/1
75 TABLET ORAL DAILY
Qty: 90 TABLET | Refills: 0 | Status: SHIPPED | OUTPATIENT
Start: 2023-11-16 | End: 2024-03-04

## 2023-11-16 ASSESSMENT — ANXIETY QUESTIONNAIRES
7. FEELING AFRAID AS IF SOMETHING AWFUL MIGHT HAPPEN: NEARLY EVERY DAY
2. NOT BEING ABLE TO STOP OR CONTROL WORRYING: SEVERAL DAYS
1. FEELING NERVOUS, ANXIOUS, OR ON EDGE: NEARLY EVERY DAY
5. BEING SO RESTLESS THAT IT IS HARD TO SIT STILL: NEARLY EVERY DAY
GAD7 TOTAL SCORE: 17
IF YOU CHECKED OFF ANY PROBLEMS ON THIS QUESTIONNAIRE, HOW DIFFICULT HAVE THESE PROBLEMS MADE IT FOR YOU TO DO YOUR WORK, TAKE CARE OF THINGS AT HOME, OR GET ALONG WITH OTHER PEOPLE: SOMEWHAT DIFFICULT
6. BECOMING EASILY ANNOYED OR IRRITABLE: MORE THAN HALF THE DAYS
GAD7 TOTAL SCORE: 17
3. WORRYING TOO MUCH ABOUT DIFFERENT THINGS: NEARLY EVERY DAY

## 2023-11-16 ASSESSMENT — PATIENT HEALTH QUESTIONNAIRE - PHQ9
5. POOR APPETITE OR OVEREATING: MORE THAN HALF THE DAYS
SUM OF ALL RESPONSES TO PHQ QUESTIONS 1-9: 17

## 2023-11-16 ASSESSMENT — PAIN SCALES - GENERAL: PAINLEVEL: NO PAIN (0)

## 2023-11-16 NOTE — PROGRESS NOTES
Assessment & Plan     1. Hypothyroidism, unspecified type  Have been increasing levothyroxine dose for elevated TSH. Overall symptoms have improved.   - TSH  - T4, free  - levothyroxine (SYNTHROID/LEVOTHROID) 75 MCG tablet; Take 1 tablet (75 mcg) by mouth daily  Dispense: 90 tablet; Refill: 0    2. Crohn's disease without complication, unspecified gastrointestinal tract location (H)  Was initially diagnosed during her first pregnancy and had large flair when had Covid infection. Last colonoscopy was March 2023. She is now in remission. Currently on mesalamine and PRN suppositories.          Depression Screening Follow Up        11/16/2023     7:57 AM   PHQ   PHQ-9 Total Score 17   Q9: Thoughts of better off dead/self-harm past 2 weeks Not at all         11/16/2023     7:57 AM   Last PHQ-9   1.  Little interest or pleasure in doing things 1   2.  Feeling down, depressed, or hopeless 3   3.  Trouble falling or staying asleep, or sleeping too much 2   4.  Feeling tired or having little energy 2   5.  Poor appetite or overeating 3   6.  Feeling bad about yourself 0   7.  Trouble concentrating 3   8.  Moving slowly or restless 3   Q9: Thoughts of better off dead/self-harm past 2 weeks 0   PHQ-9 Total Score 17   Difficulty at work, home, or with people Somewhat difficult       Follow Up Actions Taken  Patient counseled, no additional follow up at this time.  Patient has experienced a recent significant life event.  Patient counseled, no additional follow up at this time.  Will follow up during physical in 1 month      Tete Tay MD  [facility] SEAN Kiser is a 38 year old, presenting for the following health issues:  Establish Care (Needs thyroid check)      11/16/2023     7:49 AM   Additional Questions   Roomed by Alanna   Accompanied by Self         11/16/2023     7:49 AM   Patient Reported Additional Medications   Patient reports taking the following new medications no  "      History of Present Illness       Hypothyroidism:     Since last visit, patient describes the following symptoms::  Anxiety and Weight gain    Weight gain::  Less than 5 lbs.    She eats 2-3 servings of fruits and vegetables daily.She consumes 0 sweetened beverage(s) daily.She exercises with enough effort to increase her heart rate 30 to 60 minutes per day.  She exercises with enough effort to increase her heart rate 6 days per week.   She is taking medications regularly.    Last PCP recently retired, looking to establish with a new doc.      Feels that fatigue and lethargy has greatly improved since increasing levothyroxine dose in June. No dry skin or weak nails. No hair loss. Denies significant constipation or diarrhea. Does get hot flashes often.     Sees [facility] for Crohn's disease; on mesalamine, currently in remission.     She is under increased stress as she has recently lost family due to the events in [place] and her children have guards at school presently. She has been able to cope with the help of her family and partner. She sees an acupuncturist regularly and would prefer to hold off on any medications to help with mood symptoms/anxiety at this time.    Review of Systems   RESP: NEGATIVE for significant cough or SOB  CV: NEGATIVE for chest pain, palpitations or peripheral edema  ENDOCRINE: POSITIVE for hot flashes. NEGATIVE for menstrual irregularities.   PSYCHIATRIC: POSITIVE foranxiety      Objective    /64 (BP Location: Right arm, Patient Position: Sitting, Cuff Size: Adult Regular)   Pulse 68   Temp 98.4  F (36.9  C) (Tympanic)   Resp 16   Ht 1.67 m (5' 5.75\")   Wt 63.8 kg (140 lb 11.2 oz)   LMP 10/15/2023 (Exact Date)   SpO2 98%   BMI 22.88 kg/m    Body mass index is 22.88 kg/m .  Physical Exam   GENERAL: healthy, alert and no distress  NECK: no adenopathy, no asymmetry, masses, or scars and thyroid normal to palpation  RESP: lungs clear to auscultation - no rales, rhonchi or " wheezes  CV: regular rate and rhythm, normal S1 S2, no murmur, click or rub, no peripheral edema and peripheral pulses strong  MS: no gross musculoskeletal defects noted, no edema  PSYCH: Bright mood, normal affect              11/16/2023     7:57 AM   RITA-7 SCORE   Total Score 17         11/16/2023     7:57 AM   PHQ   PHQ-9 Total Score 17   Q9: Thoughts of better off dead/self-harm past 2 weeks Not at all

## 2023-11-24 NOTE — RESULT ENCOUNTER NOTE
Dear Margi,    Your lab results are normal.  At this time, I recommend to continue your current dose.  I recommend you recheck your labs one more time in 6 months.  If stable, you can go back to checking every year at your yearly physical.  I have placed the orders - please schedule a lab check around May 2024.    Please feel free to call with any questions.  Otherwise, we can discuss further at your next appointment.    Sincerely,    Josy Padilla MD

## 2024-01-04 ENCOUNTER — OFFICE VISIT (OUTPATIENT)
Dept: PEDIATRICS | Facility: CLINIC | Age: 39
End: 2024-01-04
Payer: COMMERCIAL

## 2024-01-04 VITALS
WEIGHT: 140.1 LBS | BODY MASS INDEX: 22.52 KG/M2 | HEART RATE: 64 BPM | RESPIRATION RATE: 20 BRPM | HEIGHT: 66 IN | SYSTOLIC BLOOD PRESSURE: 106 MMHG | OXYGEN SATURATION: 100 % | DIASTOLIC BLOOD PRESSURE: 73 MMHG

## 2024-01-04 DIAGNOSIS — E03.9 HYPOTHYROIDISM, UNSPECIFIED TYPE: ICD-10-CM

## 2024-01-04 DIAGNOSIS — Z00.00 ROUTINE GENERAL MEDICAL EXAMINATION AT A HEALTH CARE FACILITY: Primary | ICD-10-CM

## 2024-01-04 DIAGNOSIS — B00.1 COLD SORE: ICD-10-CM

## 2024-01-04 PROCEDURE — 99395 PREV VISIT EST AGE 18-39: CPT | Mod: GC

## 2024-01-04 RX ORDER — CEPHALEXIN 500 MG/1
500 CAPSULE ORAL 4 TIMES DAILY
Qty: 28 CAPSULE | Refills: 0 | Status: SHIPPED | OUTPATIENT
Start: 2024-01-04 | End: 2024-01-04

## 2024-01-04 RX ORDER — VALACYCLOVIR HYDROCHLORIDE 1 G/1
2000 TABLET, FILM COATED ORAL 2 TIMES DAILY
Qty: 8 TABLET | Refills: 11 | Status: SHIPPED | OUTPATIENT
Start: 2024-01-04

## 2024-01-04 ASSESSMENT — ENCOUNTER SYMPTOMS
NERVOUS/ANXIOUS: 0
JOINT SWELLING: 0
ABDOMINAL PAIN: 0
HEMATOCHEZIA: 0
SHORTNESS OF BREATH: 0
MYALGIAS: 0
FREQUENCY: 0
DIZZINESS: 0
PALPITATIONS: 0
HEADACHES: 0
PARESTHESIAS: 0
COUGH: 0
CONSTIPATION: 0
CHILLS: 0
BREAST MASS: 0
SORE THROAT: 0
WEAKNESS: 0
NAUSEA: 0
EYE PAIN: 0
FEVER: 0
HEMATURIA: 0
DIARRHEA: 0
HEARTBURN: 0
DYSURIA: 0
ARTHRALGIAS: 0

## 2024-01-04 NOTE — PATIENT INSTRUCTIONS
Mariah Kiser!    So nice to see you again! To review what we discussed:    1) Please try to get ahold of previous vaccination records and send them to the clinic at your earliest convenience (HPV, flu)    2) Please come back in May 2024 for a lab draw only to check your thyroid.    3) We will send a [employer] message within 1 week to check on the cold sore.     4) We've sent a prescription for Valtrex that you can use at the start of cold sore formation (take 2 pills at the start, followed by 2 pills 12 hours later).    Tete Dye MD       Preventive Health Recommendations  Female Ages 26 - 39  Yearly exam:   See your health care provider every year in order to  Review health changes.   Discuss preventive care.    Review your medicines if you your doctor has prescribed any.    Until age 30: Get a Pap test every three years (more often if you have had an abnormal result).    After age 30: Talk to your doctor about whether you should have a Pap test every 3 years or have a Pap test with HPV screening every 5 years.   You do not need a Pap test if your uterus was removed (hysterectomy) and you have not had cancer.  You should be tested each year for STDs (sexually transmitted diseases), if you're at risk.   Talk to your provider about how often to have your cholesterol checked.  If you are at risk for diabetes, you should have a diabetes test (fasting glucose).  Shots: Get a flu shot each year. Get a tetanus shot every 10 years.   Nutrition:   Eat at least 5 servings of fruits and vegetables each day.  Eat whole-grain bread, whole-wheat pasta and brown rice instead of white grains and rice.  Get adequate Calcium and Vitamin D.     Lifestyle  Exercise at least 150 minutes a week (30 minutes a day, 5 days of the week). This will help you control your weight and prevent disease.  Limit alcohol to one drink per day.  No smoking.   Wear sunscreen to prevent skin cancer.  See your dentist every six months for an  exam and cleaning.

## 2024-01-04 NOTE — PROGRESS NOTES
SUBJECTIVE:   Margi is a 38 year old, presenting for the following:  Physical        1/4/2024     1:00 PM   Additional Questions   Roomed by Kira Hardy   Accompanied by N/A       Healthy Habits:     Getting at least 3 servings of Calcium per day:  Yes    Bi-annual eye exam:  Yes    Dental care twice a year:  Yes    Sleep apnea or symptoms of sleep apnea:  None    Diet:  Other    Frequency of exercise:  6-7 days/week    Duration of exercise:  45-60 minutes    Taking medications regularly:  Yes    Medication side effects:  None    Additional concerns today:  No    Having crohn's flare. She is at about the 3 week shamir.     Mom breast cancer in her 50s, currently in remission. Aunt diagnosed in her 30s. Grandmother also with breast cancer. She had previously discussed cancer hx with genetic counselor at [facility]; at that time, she had been told she did not need any BRCA testing because of the type of breast cancer her mom had.     Dad was mid-60s when he had a heart attack.     Noticed a painful skin lesion at the bottom of her R nostril. Appeared 48hrs ago. Thought it was a pimple at first.     Social History     Tobacco Use    Smoking status: Never    Smokeless tobacco: Never   Substance Use Topics    Alcohol use: No     Comment: very moderate             1/4/2024    12:52 PM   Alcohol Use   Prescreen: >3 drinks/day or >7 drinks/week? Not Applicable     Reviewed orders with patient.  Reviewed health maintenance and updated orders accordingly - Yes    Breast Cancer Screening:    FHS-7:       1/4/2024    12:55 PM   Breast CA Risk Assessment (FHS-7)   Did any of your first-degree relatives have breast or ovarian cancer? Yes   Did any of your relatives have bilateral breast cancer? No   Did any man in your family have breast cancer? No   Did any woman in your family have breast and ovarian cancer? Yes   Did any woman in your family have breast cancer before age 50 y? Yes   Do you have 2 or more relatives with breast  "and/or ovarian cancer? Yes   Do you have 2 or more relatives with breast and/or bowel cancer? No     Patient under 40 years of age: Routine Mammogram Screening not recommended.   Pertinent mammograms are reviewed under the imaging tab.    History of abnormal Pap smear:   NO - age 30-65 PAP every 5 years with negative HPV co-testing recommended        10/10/2019    12:00 AM 3/21/2017    12:00 AM   PAP / HPV   HPV_EXT - HISTORICAL See Scanned Report     PAP-ABSTRACT  See Scanned Document           This result is from an external source.     Reviewed and updated as needed this visit by clinical staff   Tobacco   Meds              Reviewed and updated as needed this visit by Provider                   Review of Systems   Constitutional:  Negative for chills and fever.   HENT:  Negative for congestion, ear pain, hearing loss and sore throat.    Eyes:  Negative for pain and visual disturbance.   Respiratory:  Negative for cough and shortness of breath.    Cardiovascular:  Negative for chest pain, palpitations and peripheral edema.   Gastrointestinal:  Negative for abdominal pain, constipation, diarrhea, heartburn, hematochezia and nausea.   Breasts:  Negative for tenderness, breast mass and discharge.   Genitourinary:  Negative for dysuria, frequency, genital sores, hematuria, pelvic pain, urgency, vaginal bleeding and vaginal discharge.   Musculoskeletal:  Negative for arthralgias, joint swelling and myalgias.   Skin:  Negative for rash.   Neurological:  Negative for dizziness, weakness, headaches and paresthesias.   Psychiatric/Behavioral:  Negative for mood changes. The patient is not nervous/anxious.         OBJECTIVE:   /73 (BP Location: Right arm, Patient Position: Sitting, Cuff Size: Adult Regular)   Pulse 64   Resp 20   Ht 1.671 m (5' 5.79\")   Wt 63.5 kg (140 lb 1.6 oz)   LMP 12/18/2023 (Exact Date)   SpO2 100%   BMI 22.76 kg/m    Physical Exam  GENERAL: healthy, alert and no distress  EYES: Eyes " grossly normal to inspection, PERRL and conjunctivae and sclerae normal  HENT: ear canals and TM's normal; cluster of clear-yellow vesicles on erythematous base at base of R nare  NECK: no adenopathy, no asymmetry, masses, or scars and thyroid normal to palpation  RESP: lungs clear to auscultation - no rales, rhonchi or wheezes  CV: regular rate and rhythm, normal S1 S2, no S3 or S4, no murmur, click or rub, no peripheral edema and peripheral pulses strong  ABDOMEN: soft, nontender, no hepatosplenomegaly, no masses and bowel sounds normal  MS: no gross musculoskeletal defects noted, no edema  SKIN: no suspicious lesions or rashes  NEURO: Normal strength and tone, mentation intact and speech normal  PSYCH: mentation appears normal, affect normal/bright    Diagnostic Test Results:  Labs reviewed in Epic    ASSESSMENT/PLAN:   Margi Little is a 39yo F presenting for annual physical.    1. Routine general medical examination at a health care facility  - states that she thought she had previously completed HPV vaccination series, will try to obtain records  - will provide record of flu vaccine; gets flu shot with her family with children's pediatrician yearly    2. Hypothyroidism, unspecified type  11/16/2023 TSH 2.17, T4 1.33. Hypothyroidism is currently well controlled. Will check again 05/2024; should these be wnl, can move to yearly checks.   - TSH; Future  - T4, free; Future    3. Cold sore  At bottom of R nostril. Will reach out in 1 week to make sure it is continuing to heal and there isn't concern for superimposed bacterial infection.   - valACYclovir (VALTREX) 1000 mg tablet; Take 2 tablets (2,000 mg) by mouth 2 times daily  Dispense: 8 tablet; Refill: 11        COUNSELING:  Reviewed preventive health counseling, as reflected in patient instructions  Special attention given to:        Regular exercise       Healthy diet/nutrition       Immunizations         Colorectal Cancer Screening    She reports that  she has never smoked. She has never used smokeless tobacco.          Tete Tay MD  [facility]

## 2024-03-04 ENCOUNTER — MYC REFILL (OUTPATIENT)
Dept: PEDIATRICS | Facility: CLINIC | Age: 39
End: 2024-03-04
Payer: COMMERCIAL

## 2024-03-04 DIAGNOSIS — E03.9 HYPOTHYROIDISM, UNSPECIFIED TYPE: ICD-10-CM

## 2024-03-04 RX ORDER — LEVOTHYROXINE SODIUM 75 UG/1
75 TABLET ORAL DAILY
Qty: 90 TABLET | Refills: 0 | Status: SHIPPED | OUTPATIENT
Start: 2024-03-04 | End: 2024-05-21

## 2024-05-17 ENCOUNTER — OFFICE VISIT (OUTPATIENT)
Dept: PEDIATRICS | Facility: CLINIC | Age: 39
End: 2024-05-17
Payer: COMMERCIAL

## 2024-05-17 ENCOUNTER — NURSE TRIAGE (OUTPATIENT)
Dept: PEDIATRICS | Facility: CLINIC | Age: 39
End: 2024-05-17
Payer: COMMERCIAL

## 2024-05-17 VITALS
HEIGHT: 67 IN | TEMPERATURE: 98.2 F | OXYGEN SATURATION: 99 % | RESPIRATION RATE: 18 BRPM | SYSTOLIC BLOOD PRESSURE: 96 MMHG | HEART RATE: 71 BPM | DIASTOLIC BLOOD PRESSURE: 72 MMHG | BODY MASS INDEX: 20.18 KG/M2 | WEIGHT: 128.6 LBS

## 2024-05-17 DIAGNOSIS — K64.4 EXTERNAL HEMORRHOID: Primary | ICD-10-CM

## 2024-05-17 PROCEDURE — 99213 OFFICE O/P EST LOW 20 MIN: CPT | Performed by: PHYSICIAN ASSISTANT

## 2024-05-17 RX ORDER — HYDROCORTISONE ACETATE 25 MG/1
25 SUPPOSITORY RECTAL 2 TIMES DAILY
Qty: 24 SUPPOSITORY | Refills: 0 | Status: SHIPPED | OUTPATIENT
Start: 2024-05-17

## 2024-05-17 ASSESSMENT — PAIN SCALES - GENERAL: PAINLEVEL: SEVERE PAIN (7)

## 2024-05-17 NOTE — PROGRESS NOTES
"  Assessment & Plan     External hemorrhoid  Begin anusol as directed. Referral to colorectal.   - hydrocortisone (ANUSOL-HC) 25 MG suppository; Place 1 suppository (25 mg) rectally 2 times daily  - Adult Colorectal Surgery  Referral; Future    Subjective   Margi is a 39 year old, presenting for the following health issues:  Rectal Problem        5/17/2024     9:49 AM   Additional Questions   Roomed by Kira Hardy   Accompanied by N/A     History of Present Illness       Reason for visit:  Hemoriods  Symptom onset:  3-7 days ago  Symptoms include:  Discomfort pain  Symptom intensity:  Severe  Symptom progression:  Worsening  Had these symptoms before:  No    She eats 2-3 servings of fruits and vegetables daily.She consumes 1 sweetened beverage(s) daily.She exercises with enough effort to increase her heart rate 30 to 60 minutes per day.  She exercises with enough effort to increase her heart rate 6 days per week.   She is taking medications regularly.   Hemorrhoids  Onset/Duration: 5/12/24  Description:   Mary-anal lump: YES  Pain: YES  Itching: No  Accompanying Signs & Symptoms:  Blood in stool: No - pt has crohns so bloody stools are not uncommon for her   Changes in stool pattern: No  History:   Any previous GI studies done:Colonoscopy and stool sample  Family History of colon cancer: No  Precipitating factors:   Sitting   Alleviating factors:  None  Therapies tried and outcome: increased fluids        Review of Systems  Constitutional, HEENT, cardiovascular, pulmonary, gi and gu systems are negative, except as otherwise noted.      Objective    BP 96/72 (BP Location: Right arm, Patient Position: Sitting, Cuff Size: Adult Regular)   Pulse 71   Temp 98.2  F (36.8  C) (Tympanic)   Resp 18   Ht 1.702 m (5' 7\")   Wt 58.3 kg (128 lb 9.6 oz)   LMP 05/15/2024 (Exact Date)   SpO2 99%   Breastfeeding No   BMI 20.14 kg/m    Body mass index is 20.14 kg/m .  Physical Exam   GENERAL: alert and no " distress  RECTUM: small external hemorrhoid present. Able to reduce it.        Signed Electronically by: Zeb Cartwright PA-C

## 2024-05-17 NOTE — TELEPHONE ENCOUNTER
Nurse Triage SBAR    Is this a 2nd Level Triage? NO    Situation: external hemorrhoids-     Background:  large and painful external hemorrhoid since Monday.  Recently had GI issues and constipation. Pt has been straining.   Have tried preparation h and other OTC remedies with no relief.   Pt is also on her period so not sure if hemorrhoid also bleeding.       Assessment: pain 7/10, unable to sit or walk. Burning and painful.     Protocol Recommended Disposition:   Go To Office Now    Recommendation: OV scheduled.    Reviewed care advice under care tab with pt.  Instructed pt to call back if new or worsening symptoms.   Patient was given an opportunity to ask questions, verbalized understanding of plan, and is agreeable.          Does the patient meet one of the following criteria for ADS visit consideration? 16+ years old, with an [facility] PCP     TIP  Providers, please consider if this condition is appropriate for management at one of our Acute and Diagnostic Services sites.     If patient is a good candidate, please use dotphrase <dot>triageresponse and select Refer to ADS to document.  Reason for Disposition   Acute onset rectal pain and constipation (straining with rectal pressure or fullness), which is not relieved by Sitz bath or suppository    Additional Information   Negative: Sounds like a life-threatening emergency to the triager   Negative: Diarrhea is main symptom   Negative: Constipation is main symptom (e.g., pain or discomfort caused by passage of hard BMs)   Negative: Blood in or on bowel movement is main symptom   Negative: MPOX SUSPECTED (e.g., direct skin contact such as sex, recent travel to [place] or [place]) and any SYMPTOMS OF MPOX (e.g., rash, fever, muscle aches, or swollen lymph nodes)   Negative: At risk for Mpox (men-who-have-sex-with-men) and possible exposure (e.g., multiple sex partners in past 21 days) and ANY SYMPTOMS OF MPOX (e.g., rash, fever, muscle aches, or swollen lymph  "nodes)   Negative: Sexual assault or rape (sexual intercourse or activity occurs without freely given consent), known or suspected   Negative: Injury to rectum   Negative: Patient sounds very sick or weak to the triager   Negative: Severe rectal pain   Negative: Rectal pain or redness and fever > 100.4 F (38.0 C)    Answer Assessment - Initial Assessment Questions  1. SYMPTOM:  \"What's the main symptom you're concerned about?\" (e.g., pain, itching, swelling, rash)      Hemorrhoids   2. ONSET: \"When did the   start?\"      Saturday   3. RECTAL PAIN: \"Do you have any pain around your rectum?\" \"How bad is the pain?\"  (Scale 0-10; or mild, moderate, severe)    - NONE (0): no pain    - MILD (1-3): doesn't interfere with normal activities     - MODERATE (4-7): interferes with normal activities or awakens from sleep, limping     - SEVERE (8-10): excruciating pain, unable to have a bowel movement       7/10 very uncomfortable   4. RECTAL ITCHING: \"Do you have any itching in this area?\" \"How bad is the itching?\"  (Scale 0-10; or mild, moderate, severe)    - NONE: no itching    - MILD: doesn't interfere with normal activities     - MODERATE-SEVERE: interferes with normal activities or awakens from sleep        5. CONSTIPATION: \"Do you have constipation?\" If Yes, ask: \"How bad is it?\"      Mild   6. CAUSE: \"What do you think is causing the anus symptoms?\"      Straining   7. OTHER SYMPTOMS: \"Do you have any other symptoms?\"  (e.g., abdomen pain, fever, rectal bleeding, vomiting)      No,   8. PREGNANCY: \"Is there any chance you are pregnant?\" \"When was your last menstrual period?\"      no    Protocols used: Rectal Symptoms-A-BRYANNA Raymundo RN    "

## 2024-05-21 ENCOUNTER — MYC REFILL (OUTPATIENT)
Dept: PEDIATRICS | Facility: CLINIC | Age: 39
End: 2024-05-21
Payer: COMMERCIAL

## 2024-05-21 DIAGNOSIS — E03.9 HYPOTHYROIDISM, UNSPECIFIED TYPE: ICD-10-CM

## 2024-05-21 RX ORDER — LEVOTHYROXINE SODIUM 75 UG/1
75 TABLET ORAL DAILY
Qty: 90 TABLET | Refills: 1 | Status: SHIPPED | OUTPATIENT
Start: 2024-05-21

## 2024-07-22 ENCOUNTER — MYC MEDICAL ADVICE (OUTPATIENT)
Dept: PEDIATRICS | Facility: CLINIC | Age: 39
End: 2024-07-22
Payer: COMMERCIAL

## 2024-07-22 DIAGNOSIS — E03.9 HYPOTHYROIDISM, UNSPECIFIED TYPE: Primary | ICD-10-CM

## 2024-07-23 RX ORDER — LEVOTHYROXINE SODIUM 75 UG/1
75 TABLET ORAL DAILY
Qty: 5 TABLET | Refills: 0 | Status: SHIPPED | OUTPATIENT
Start: 2024-07-23

## 2024-07-23 NOTE — TELEPHONE ENCOUNTER
Sending short term supply of levothyroxine to local pharmacy per patient request.   Rani CHERRY RN, BSN

## 2024-10-22 ENCOUNTER — MYC REFILL (OUTPATIENT)
Dept: PEDIATRICS | Facility: CLINIC | Age: 39
End: 2024-10-22
Payer: COMMERCIAL

## 2024-10-22 DIAGNOSIS — E03.9 HYPOTHYROIDISM, UNSPECIFIED TYPE: ICD-10-CM

## 2024-10-22 RX ORDER — LEVOTHYROXINE SODIUM 75 UG/1
75 TABLET ORAL DAILY
Qty: 90 TABLET | Refills: 0 | Status: SHIPPED | OUTPATIENT
Start: 2024-10-22

## 2025-01-20 DIAGNOSIS — E03.9 HYPOTHYROIDISM, UNSPECIFIED TYPE: ICD-10-CM

## 2025-01-22 RX ORDER — LEVOTHYROXINE SODIUM 75 UG/1
75 TABLET ORAL DAILY
Qty: 90 TABLET | Refills: 0 | Status: SHIPPED | OUTPATIENT
Start: 2025-01-22

## 2025-02-08 ENCOUNTER — HEALTH MAINTENANCE LETTER (OUTPATIENT)
Age: 40
End: 2025-02-08

## 2025-05-01 ENCOUNTER — TRANSFERRED RECORDS (OUTPATIENT)
Dept: HEALTH INFORMATION MANAGEMENT | Facility: CLINIC | Age: 40
End: 2025-05-01
Payer: COMMERCIAL